# Patient Record
Sex: FEMALE | Race: WHITE
[De-identification: names, ages, dates, MRNs, and addresses within clinical notes are randomized per-mention and may not be internally consistent; named-entity substitution may affect disease eponyms.]

---

## 2020-03-06 ENCOUNTER — HOSPITAL ENCOUNTER (OUTPATIENT)
Dept: HOSPITAL 53 - M LAB | Age: 25
End: 2020-03-06
Attending: OBSTETRICS & GYNECOLOGY
Payer: MEDICAID

## 2020-03-06 ENCOUNTER — HOSPITAL ENCOUNTER (OUTPATIENT)
Dept: HOSPITAL 53 - M LAB REF | Age: 25
End: 2020-03-06
Attending: OBSTETRICS & GYNECOLOGY
Payer: COMMERCIAL

## 2020-03-06 DIAGNOSIS — Z3A.00: ICD-10-CM

## 2020-03-06 DIAGNOSIS — Z36.9: Primary | ICD-10-CM

## 2020-03-06 DIAGNOSIS — Z34.02: Primary | ICD-10-CM

## 2020-03-06 LAB
CREAT 24H UR-MRATE: 1838.2 MG/24HR (ref 600–1800)
CREAT UR-MCNC: 81.7 MG/DL
GLUCOSE 1H P 50 G GLC PO SERPL-MCNC: 85 MG/DL (ref ?–140)
PROT 24H UR-MRATE: 335.2 MG/24HR (ref 50–150)
PROT UR-MCNC: 14.9 MG/DL (ref 0–12)

## 2020-04-08 ENCOUNTER — HOSPITAL ENCOUNTER (OUTPATIENT)
Dept: HOSPITAL 53 - M LAB | Age: 25
End: 2020-04-08
Attending: OBSTETRICS & GYNECOLOGY
Payer: COMMERCIAL

## 2020-04-08 DIAGNOSIS — Z34.83: Primary | ICD-10-CM

## 2020-04-08 DIAGNOSIS — Z3A.00: ICD-10-CM

## 2020-04-08 LAB
ALT SERPL W P-5'-P-CCNC: 11 U/L (ref 12–78)
BILIRUB SERPL-MCNC: 0.5 MG/DL (ref 0.2–1)
CREAT 24H UR-MRATE: 1643.2 MG/24HR (ref 600–1800)
CREAT SERPL-MCNC: 0.47 MG/DL (ref 0.55–1.3)
CREAT UR-MCNC: 93.9 MG/DL
GFR SERPL CREATININE-BSD FRML MDRD: > 60 ML/MIN/{1.73_M2} (ref 60–?)
HCT VFR BLD AUTO: 32.1 % (ref 36–47)
HGB BLD-MCNC: 10.1 G/DL (ref 12–15.5)
LDH SERPL L TO P-CCNC: 120 U/L (ref 84–246)
MCH RBC QN AUTO: 23.8 PG (ref 27–33)
MCHC RBC AUTO-ENTMCNC: 31.5 G/DL (ref 32–36.5)
MCV RBC AUTO: 75.5 FL (ref 80–96)
PLATELET # BLD AUTO: 296 10^3/UL (ref 150–450)
PROT 24H UR-MRATE: 280 MG/24HR (ref 50–150)
PROT UR-MCNC: 16 MG/DL (ref 0–12)
RBC # BLD AUTO: 4.25 10^6/UL (ref 4–5.4)
URATE SERPL-MCNC: 4.3 MG/DL (ref 2.6–6)
WBC # BLD AUTO: 10.5 10^3/UL (ref 4–10)

## 2020-04-24 ENCOUNTER — HOSPITAL ENCOUNTER (OUTPATIENT)
Dept: HOSPITAL 53 - M LAB REF | Age: 25
End: 2020-04-24
Attending: OBSTETRICS & GYNECOLOGY
Payer: COMMERCIAL

## 2020-04-24 DIAGNOSIS — Z3A.00: ICD-10-CM

## 2020-04-24 DIAGNOSIS — Z36.89: Primary | ICD-10-CM

## 2020-05-01 ENCOUNTER — HOSPITAL ENCOUNTER (INPATIENT)
Dept: HOSPITAL 53 - M LDI | Age: 25
LOS: 7 days | Discharge: HOME | DRG: 540 | End: 2020-05-08
Attending: OBSTETRICS & GYNECOLOGY | Admitting: OBSTETRICS & GYNECOLOGY
Payer: COMMERCIAL

## 2020-05-01 VITALS — WEIGHT: 264.11 LBS | HEIGHT: 65 IN | BODY MASS INDEX: 44 KG/M2

## 2020-05-01 DIAGNOSIS — O34.211: Primary | ICD-10-CM

## 2020-05-01 DIAGNOSIS — Z3A.36: ICD-10-CM

## 2020-05-06 VITALS — SYSTOLIC BLOOD PRESSURE: 116 MMHG | DIASTOLIC BLOOD PRESSURE: 70 MMHG

## 2020-05-06 VITALS — DIASTOLIC BLOOD PRESSURE: 81 MMHG | SYSTOLIC BLOOD PRESSURE: 129 MMHG

## 2020-05-06 VITALS — DIASTOLIC BLOOD PRESSURE: 76 MMHG | SYSTOLIC BLOOD PRESSURE: 121 MMHG

## 2020-05-06 VITALS — SYSTOLIC BLOOD PRESSURE: 119 MMHG | DIASTOLIC BLOOD PRESSURE: 59 MMHG

## 2020-05-06 VITALS — DIASTOLIC BLOOD PRESSURE: 66 MMHG | SYSTOLIC BLOOD PRESSURE: 112 MMHG

## 2020-05-06 VITALS — DIASTOLIC BLOOD PRESSURE: 62 MMHG | SYSTOLIC BLOOD PRESSURE: 108 MMHG

## 2020-05-06 VITALS — DIASTOLIC BLOOD PRESSURE: 69 MMHG | SYSTOLIC BLOOD PRESSURE: 112 MMHG

## 2020-05-06 VITALS — SYSTOLIC BLOOD PRESSURE: 123 MMHG | DIASTOLIC BLOOD PRESSURE: 86 MMHG

## 2020-05-06 VITALS — SYSTOLIC BLOOD PRESSURE: 124 MMHG | DIASTOLIC BLOOD PRESSURE: 76 MMHG

## 2020-05-06 LAB
ALT SERPL W P-5'-P-CCNC: 16 U/L (ref 12–78)
BASE EXCESS BLDCOA CALC-SCNC: -1.9 MMOL/L
BASE EXCESS BLDCOV CALC-SCNC: -1.8 MMOL/L
BASOPHILS # BLD AUTO: 0 10^3/UL (ref 0–0.2)
BASOPHILS NFR BLD AUTO: 0.3 % (ref 0–1)
BILIRUB SERPL-MCNC: 0.5 MG/DL (ref 0.2–1)
CO2 BLDCOA CALC-SCNC: 25.9 MEQ/L
CO2 BLDCOV CALC-SCNC: 23.7 MEQ/L
CREAT SERPL-MCNC: 0.5 MG/DL (ref 0.55–1.3)
EOSINOPHIL # BLD AUTO: 0.2 10^3/UL (ref 0–0.5)
EOSINOPHIL NFR BLD AUTO: 1.8 % (ref 0–3)
GFR SERPL CREATININE-BSD FRML MDRD: > 60 ML/MIN/{1.73_M2} (ref 60–?)
HCO3 STD BLDCOA-SCNC: 21.5 MEQ/L
HCO3 STD BLDCOA-SCNC: 24.5 MEQ/L
HCO3 STD BLDCOV-SCNC: 22.6 MEQ/L
HCO3 STD BLDCOV-SCNC: 22.9 MEQ/L
HCT VFR BLD AUTO: 32.5 % (ref 36–47)
HGB BLD-MCNC: 9.8 G/DL (ref 12–15.5)
LDH SERPL L TO P-CCNC: 149 U/L (ref 84–246)
LYMPHOCYTES # BLD AUTO: 2.2 10^3/UL (ref 1.5–5)
LYMPHOCYTES NFR BLD AUTO: 20.1 % (ref 24–44)
MCH RBC QN AUTO: 21.9 PG (ref 27–33)
MCHC RBC AUTO-ENTMCNC: 30.2 G/DL (ref 32–36.5)
MCV RBC AUTO: 72.5 FL (ref 80–96)
MONOCYTES # BLD AUTO: 1.1 10^3/UL (ref 0–0.8)
MONOCYTES NFR BLD AUTO: 10 % (ref 0–5)
NEUTROPHILS # BLD AUTO: 7.2 10^3/UL (ref 1.5–8.5)
NEUTROPHILS NFR BLD AUTO: 65.9 % (ref 36–66)
PCO2 BLDCOA: 47.9 MMHG
PCO2 BLDCOV: 37.2 MMHG
PH BLDCOA: 7.33 UNITS
PH BLDCOV: 7.4 UNITS
PLATELET # BLD AUTO: 313 10^3/UL (ref 150–450)
PO2 BLDCOA: 19.1 MMHG
PO2 BLDCOV: 51.7 MMHG
RBC # BLD AUTO: 4.48 10^6/UL (ref 4–5.4)
SAO2 % BLDCOA: 36.4 %
SAO2 % BLDCOV: 93.1 %
URATE SERPL-MCNC: 5.7 MG/DL (ref 2.6–6)
WBC # BLD AUTO: 10.9 10^3/UL (ref 4–10)

## 2020-05-06 RX ADMIN — FENTANYL CITRATE PRN MCG: 50 INJECTION, SOLUTION INTRAMUSCULAR; INTRAVENOUS at 13:24

## 2020-05-06 RX ADMIN — DOCUSATE SODIUM SCH MG: 100 CAPSULE, LIQUID FILLED ORAL at 21:00

## 2020-05-06 RX ADMIN — METOCLOPRAMIDE PRN MG: 5 INJECTION, SOLUTION INTRAMUSCULAR; INTRAVENOUS at 21:17

## 2020-05-06 RX ADMIN — KETOROLAC TROMETHAMINE SCH MG: 30 INJECTION, SOLUTION INTRAMUSCULAR at 17:58

## 2020-05-06 RX ADMIN — METOCLOPRAMIDE PRN MG: 5 INJECTION, SOLUTION INTRAMUSCULAR; INTRAVENOUS at 15:02

## 2020-05-06 RX ADMIN — KETOROLAC TROMETHAMINE SCH MG: 30 INJECTION, SOLUTION INTRAMUSCULAR at 23:21

## 2020-05-06 RX ADMIN — FENTANYL CITRATE PRN MCG: 50 INJECTION, SOLUTION INTRAMUSCULAR; INTRAVENOUS at 13:07

## 2020-05-07 VITALS — SYSTOLIC BLOOD PRESSURE: 103 MMHG | DIASTOLIC BLOOD PRESSURE: 53 MMHG

## 2020-05-07 VITALS — SYSTOLIC BLOOD PRESSURE: 127 MMHG | DIASTOLIC BLOOD PRESSURE: 83 MMHG

## 2020-05-07 VITALS — SYSTOLIC BLOOD PRESSURE: 119 MMHG | DIASTOLIC BLOOD PRESSURE: 75 MMHG

## 2020-05-07 VITALS — DIASTOLIC BLOOD PRESSURE: 81 MMHG | SYSTOLIC BLOOD PRESSURE: 124 MMHG

## 2020-05-07 VITALS — DIASTOLIC BLOOD PRESSURE: 72 MMHG | SYSTOLIC BLOOD PRESSURE: 127 MMHG

## 2020-05-07 LAB
HCT VFR BLD AUTO: 29.4 % (ref 36–47)
HGB BLD-MCNC: 8.8 G/DL (ref 12–15.5)
MCH RBC QN AUTO: 21.8 PG (ref 27–33)
MCHC RBC AUTO-ENTMCNC: 29.9 G/DL (ref 32–36.5)
MCV RBC AUTO: 73 FL (ref 80–96)
PLATELET # BLD AUTO: 293 10^3/UL (ref 150–450)
RBC # BLD AUTO: 4.03 10^6/UL (ref 4–5.4)
WBC # BLD AUTO: 10.4 10^3/UL (ref 4–10)

## 2020-05-07 RX ADMIN — DOCUSATE SODIUM SCH MG: 100 CAPSULE, LIQUID FILLED ORAL at 10:43

## 2020-05-07 RX ADMIN — Medication SCH TAB: at 10:43

## 2020-05-07 RX ADMIN — IBUPROFEN SCH MG: 800 TABLET, FILM COATED ORAL at 21:07

## 2020-05-07 RX ADMIN — DOCUSATE SODIUM SCH MG: 100 CAPSULE, LIQUID FILLED ORAL at 21:06

## 2020-05-07 RX ADMIN — IBUPROFEN SCH MG: 800 TABLET, FILM COATED ORAL at 14:02

## 2020-05-07 RX ADMIN — KETOROLAC TROMETHAMINE SCH MG: 30 INJECTION, SOLUTION INTRAMUSCULAR at 06:23

## 2020-05-07 NOTE — IPNPDOC
Postpartum Progress Note


Date of Service:  May 7, 2020


Postpartum Day#:  1


Postpartum Progress Note


SUBJECT:23 y/o female post c/s,  doing well postpartum day # [1]. She has been 

ambulating, voiding spontaneously without issue and tolerating regular diet. 

Breast feeding without issue. Reports lochia is [like a normal period]. Patient 

is ambulating well. [Reports some cramping with breastfeeding. Denies any pain. 

Had to be straight cath this AM.





OBJECTIVE: 


VITAL SIGNS: Within normal limits, afebrile.


Alert and oriented times three.


Breath sounds clear to auscultation.


Heart rate: Regular rate and rhythm, no murmurs, rubs or gallops.


Abdomen: Fundus firm at U-2. Soft, NTTP. Incision clean and dry.


[Minimal] lochia.





ASSESSMENT: Post OP day 1. Vitals within normal limits, afebrile, 

hemodynamically stable with no evidence of infection.





PLAN:


1.Continue current care .


2. Tylenol and Motrin for pain.


3. Encourage breast feeding and ambulation.





VS, I&O, 24H, [facility]


Vital Signs/I&O





Vital Signs








  Date Time  Temp Pulse Resp B/P (MAP) Pulse Ox O2 Delivery O2 Flow Rate FiO2


 


5/7/20 06:04 97.3 86 18 127/83 (98) 98   


 


5/6/20 18:06      Room Air  











l


I&O- Last 24 Hours up to 6 AM 


 


 5/7/20





 06:00


 


Intake Total 2675 ml


 


Output Total 3300 ml


 


Balance -625 ml











Laboratory Data


24H LABS


Laboratory Tests 2


5/6/20 10:37: Serology Scanned Report Hepatitis B Testing


5/6/20 11:41: 


Cord Arterial Blood pH 7.326, Cord Arterial Blood PCO2 47.9, Cord Arterial Blood

 PO2 19.1, Cord Arterial Blood HCO3 24.5, Cord Arterial Blood Total CO2 25.9, 

Cord Arterial Blood Base Excess -1.9, Cord Arterial Base Excess (Standard 21.5, 

Cord Arterial Bld Oxygen Saturation 36.4, Cord Venous Blood pH 7.401, Cord 

Venous Blood PCO2 37.2, Cord Venous Blood PO2 51.7, Cord Venous Blood HCO3 22.6,

 Cord Venous Blood Total CO2 23.7, Cord Venous Base Excess (Actual) -1.8, Cord 

Venous Base Excess (Standard) 22.9, Cord Venous Blood Oxygen Saturation 93.1


5/7/20 06:59: Nucleated Red Blood Cells % (auto) 0.0


CBC/BMP


Laboratory Tests


5/7/20 06:59

















Gianluca Dunn DO                May 7, 2020 08:31

## 2020-05-08 VITALS — DIASTOLIC BLOOD PRESSURE: 81 MMHG | SYSTOLIC BLOOD PRESSURE: 133 MMHG

## 2020-05-08 RX ADMIN — Medication SCH TAB: at 08:07

## 2020-05-08 RX ADMIN — IBUPROFEN SCH MG: 800 TABLET, FILM COATED ORAL at 05:05

## 2020-05-08 RX ADMIN — IBUPROFEN SCH MG: 800 TABLET, FILM COATED ORAL at 14:05

## 2020-05-08 RX ADMIN — DOCUSATE SODIUM SCH MG: 100 CAPSULE, LIQUID FILLED ORAL at 08:07

## 2020-05-08 NOTE — OBDS
[facility] Obstetrical Discharge Sum.


Obstetrical Discharge Summary


Date:  May 8, 2020


Time:  08:27


:  3


Term:  37


Pre-term:  2


Abortions:  1


Livin


VDRL:  Non-Reactive


Rh:  Negative


Rubella:  Immune


Infant Sex:  Female


Infant Weight:  pounds (6)


Anesthesia:  Regional Anesthesia





A/P, Post Partum Course


List any complications


Admission diagnosis: 37 weeks with h/o two prior c/s for elective repeat c/s. 2.

Pre eclampsia


Discharge diagnosis: Same; Lower uterine segment window


Condition at Discharge: [stable]


Discharge Instructions: [continue outpatient meds. Call if increase pain, temp 

and or signs of infections.]


Activity: [as tolerated ]


Diet: [regular]


Medications: [see list]


Follow-up: [2 weeks]


Other:











Gianluca Dunn DO                May 8, 2020 08:36

## 2020-05-08 NOTE — RO
DATE OF PROCEDURE:  2020

 

Karla is a 24-year-old female,  3, para 1-1-0-2 with a history of two

prior  sections and severe preeclampsia.  She started to develop

preeclampsia with this pregnancy and decision was made to deliver the patient at

36-6/7 weeks gestation.  Given her history of prior  section, the

decision was made to proceed with a repeat  section.

 

PREOPERATIVE DIAGNOSES:

1.  Intrauterine pregnancy at 36-6/7 weeks gestation.

2.  Preeclampsia.

3.  History of two prior  sections and severe preeclampsia.

 

POSTOPERATIVE DIAGNOSES:

1.  Intrauterine pregnancy at 36-6/7 weeks gestation.

2.  Preeclampsia.

3.  History of two prior  sections and severe preeclampsia.

 

PROCEDURE:

1.  Repeat  section.

2.  Revision of old scar.

 

SURGEON:  Gianluca Dunn DO

 

ASSISTANT:  Marcella Frank

 

ANESTHESIA:  Spinal.

 

COMPLICATIONS:  None.

 

ESTIMATED BLOOD LOSS:  500 mL.

 

FINDINGS:  Live female infant, occiput transverse position.  Apgar scores 9 and

9.  Birth weight 6 pounds 12 ounces.  Placenta delivered manually intact.

Three-vessel cord.  The bilateral fallopian tubes and ovaries were within normal

limits.

 

DESCRIPTION OF PROCEDURE:  After obtaining informed consent, the patient was

taken to the operating room where spinal anesthetic was found to be adequate.

She was then draped and prepped in the usual sterile fashion in the supine

position.  At this point with the help of Marcella Frank, an elliptical incision was

made over the old scar, the old scar was removed.  The incision was then carried

down to the fascia.  Fascia was incised in midline fashion and carried through

laterally.  Superior and inferior dissection of the peritoneum was then done with

good visualization of the bladder.  At this point, a Mobius skin retractor was

placed, a low-transverse uterine incision was made.  Infant was delivered in

atraumatic fashion.  Nose and mouth bulb suctioned.  Cord doubly clamped and cut,

and infant was handed over to the awaiting warmer.  Please note that there was a

small lower uterine segment window noted, and we were able to just poke through

and rupture the amniotic sac and delivered the baby.

 

At this point, the placenta was removed manually.  Uterus cleared of all clot and

debris.  Uterine incision was then repaired in two separate layers of #0 Vicryl

sutures.  All superficial bleeders were coagulated.  Pelvis copiously irrigated

with normal saline and suctioned out.  Attention turned to the peritoneum, which

was closed in running fashion using #2-0 Vicryl.  Fascia closed in two separate

segments of #0 Vicryl sutures.  All superficial bleeders coagulated, and the skin

was reapproximated in a subcuticular fashion using #3-0 Vicryl on a Kenn.

Steri-Strips placed.  The patient tolerated procedure well.  She was then

transferred to recovery room in stable condition.